# Patient Record
Sex: MALE | URBAN - METROPOLITAN AREA
[De-identification: names, ages, dates, MRNs, and addresses within clinical notes are randomized per-mention and may not be internally consistent; named-entity substitution may affect disease eponyms.]

---

## 2017-02-09 ENCOUNTER — IMPORTED ENCOUNTER (OUTPATIENT)
Dept: URBAN - METROPOLITAN AREA CLINIC 59 | Facility: CLINIC | Age: 77
End: 2017-02-09

## 2017-02-09 PROBLEM — H11.31 SUBCONJUNCTIVAL HEMORRHAGE OF RT EYE: Noted: 2017-02-09

## 2017-02-09 PROCEDURE — 99214 OFFICE O/P EST MOD 30 MIN: CPT

## 2017-02-27 ENCOUNTER — IMPORTED ENCOUNTER (OUTPATIENT)
Dept: URBAN - METROPOLITAN AREA CLINIC 59 | Facility: CLINIC | Age: 77
End: 2017-02-27

## 2017-02-27 PROBLEM — H40.053 OCULAR HYPERTENSION, BILATERAL: Noted: 2017-02-27

## 2017-02-27 PROBLEM — H40.023 OPEN ANGLE WITH BORDERLINE FINDINGS, HIGH RISK, BILATERAL: Noted: 2017-02-27

## 2017-02-27 PROBLEM — H25.11 NUCLEAR SCLEROSIS CATARACT OF RT EYE: Noted: 2017-02-27

## 2017-02-27 PROBLEM — E11.9 TYPE II DM W/O COMPLICATIONS: Noted: 2017-02-27

## 2017-02-27 PROBLEM — H25.12 NUCLEAR SCLEROSIS CATARACT OF LT EYE: Noted: 2017-02-27

## 2017-02-27 PROCEDURE — 92133 CPTRZD OPH DX IMG PST SGM ON: CPT

## 2017-02-27 PROCEDURE — 92012 INTRM OPH EXAM EST PATIENT: CPT

## 2017-08-14 ENCOUNTER — IMPORTED ENCOUNTER (OUTPATIENT)
Dept: URBAN - METROPOLITAN AREA CLINIC 59 | Facility: CLINIC | Age: 77
End: 2017-08-14

## 2017-08-14 PROBLEM — H40.023 OPEN ANGLE WITH BORDERLINE FINDINGS, HIGH RISK, BILATERAL: Noted: 2017-08-14

## 2017-08-14 PROCEDURE — 92083 EXTENDED VISUAL FIELD XM: CPT

## 2017-08-21 ENCOUNTER — IMPORTED ENCOUNTER (OUTPATIENT)
Dept: URBAN - METROPOLITAN AREA CLINIC 59 | Facility: CLINIC | Age: 77
End: 2017-08-21

## 2017-08-21 PROBLEM — H25.11 NUCLEAR SCLEROSIS CATARACT OF RT EYE: Noted: 2017-08-21

## 2017-08-21 PROBLEM — E11.9 TYPE II DM W/O COMPLICATIONS: Noted: 2017-08-21

## 2017-08-21 PROBLEM — H25.12 NUCLEAR SCLEROSIS CATARACT OF LT EYE: Noted: 2017-08-21

## 2017-08-21 PROBLEM — H40.053 OCULAR HYPERTENSION, BILATERAL: Noted: 2017-08-21

## 2017-08-21 PROCEDURE — 92133 CPTRZD OPH DX IMG PST SGM ON: CPT

## 2017-08-21 PROCEDURE — 92014 COMPRE OPH EXAM EST PT 1/>: CPT

## 2017-09-06 ENCOUNTER — IMPORTED ENCOUNTER (OUTPATIENT)
Dept: URBAN - METROPOLITAN AREA CLINIC 59 | Facility: CLINIC | Age: 77
End: 2017-09-06

## 2017-09-06 PROBLEM — E11.9 TYPE II DM W/O COMPLICATIONS: Noted: 2017-09-06

## 2017-09-06 PROBLEM — H25.11 NUCLEAR SCLEROSIS CATARACT OF RT EYE: Noted: 2017-09-06

## 2017-09-06 PROBLEM — H40.023 OPEN ANGLE WITH BORDERLINE FINDINGS, HIGH RISK, BILATERAL: Noted: 2017-09-06

## 2017-09-06 PROBLEM — H25.12 NUCLEAR SCLEROSIS CATARACT OF LT EYE: Noted: 2017-09-06

## 2017-09-06 PROBLEM — H40.053 OCULAR HYPERTENSION, BILATERAL: Noted: 2017-09-06

## 2017-09-06 PROCEDURE — 92012 INTRM OPH EXAM EST PATIENT: CPT

## 2017-09-06 PROCEDURE — 92136 OPHTHALMIC BIOMETRY: CPT

## 2017-10-19 ENCOUNTER — IMPORTED ENCOUNTER (OUTPATIENT)
Dept: URBAN - METROPOLITAN AREA CLINIC 59 | Facility: CLINIC | Age: 77
End: 2017-10-19

## 2017-10-19 PROCEDURE — 66982 XCAPSL CTRC RMVL CPLX WO ECP: CPT

## 2017-10-20 ENCOUNTER — IMPORTED ENCOUNTER (OUTPATIENT)
Dept: URBAN - METROPOLITAN AREA CLINIC 59 | Facility: CLINIC | Age: 77
End: 2017-10-20

## 2017-10-20 PROBLEM — Z96.1 PRESENCE OF PSEUDOPHAKIA: Noted: 2017-10-20

## 2017-10-20 PROCEDURE — 99024 POSTOP FOLLOW-UP VISIT: CPT

## 2017-10-26 ENCOUNTER — IMPORTED ENCOUNTER (OUTPATIENT)
Dept: URBAN - METROPOLITAN AREA CLINIC 59 | Facility: CLINIC | Age: 77
End: 2017-10-26

## 2017-10-26 PROCEDURE — 66982 XCAPSL CTRC RMVL CPLX WO ECP: CPT

## 2017-10-27 ENCOUNTER — IMPORTED ENCOUNTER (OUTPATIENT)
Dept: URBAN - METROPOLITAN AREA CLINIC 59 | Facility: CLINIC | Age: 77
End: 2017-10-27

## 2017-10-27 PROCEDURE — 99024 POSTOP FOLLOW-UP VISIT: CPT

## 2017-11-06 ENCOUNTER — IMPORTED ENCOUNTER (OUTPATIENT)
Dept: URBAN - METROPOLITAN AREA CLINIC 59 | Facility: CLINIC | Age: 77
End: 2017-11-06

## 2017-11-06 PROBLEM — Z96.1 PRESENCE OF PSEUDOPHAKIA: Noted: 2017-11-06

## 2017-11-06 PROCEDURE — 99024 POSTOP FOLLOW-UP VISIT: CPT

## 2017-11-29 ENCOUNTER — IMPORTED ENCOUNTER (OUTPATIENT)
Dept: URBAN - METROPOLITAN AREA CLINIC 59 | Facility: CLINIC | Age: 77
End: 2017-11-29

## 2017-11-29 PROBLEM — Z96.1 PRESENCE OF PSEUDOPHAKIA: Noted: 2017-11-29

## 2017-11-29 PROCEDURE — 92015 DETERMINE REFRACTIVE STATE: CPT

## 2017-11-29 PROCEDURE — 99024 POSTOP FOLLOW-UP VISIT: CPT

## 2018-03-14 ENCOUNTER — IMPORTED ENCOUNTER (OUTPATIENT)
Dept: URBAN - METROPOLITAN AREA CLINIC 59 | Facility: CLINIC | Age: 78
End: 2018-03-14

## 2018-03-14 PROBLEM — H26.493 OTHER SECONDARY CATARACT, BILATERAL: Noted: 2018-03-14

## 2018-03-14 PROBLEM — H40.023 OPEN ANGLE WITH BORDERLINE FINDINGS, HIGH RISK, BILATERAL: Noted: 2018-03-14

## 2018-03-14 PROBLEM — E11.9 TYPE II DM W/O COMPLICATIONS: Noted: 2018-03-14

## 2018-03-14 PROCEDURE — 92014 COMPRE OPH EXAM EST PT 1/>: CPT

## 2018-03-14 PROCEDURE — 92250 FUNDUS PHOTOGRAPHY W/I&R: CPT

## 2018-09-19 ENCOUNTER — IMPORTED ENCOUNTER (OUTPATIENT)
Dept: URBAN - METROPOLITAN AREA CLINIC 59 | Facility: CLINIC | Age: 78
End: 2018-09-19

## 2018-09-19 PROBLEM — H26.492 OTHER SECONDARY CATARACT, LEFT EYE: Noted: 2018-09-19

## 2018-09-19 PROBLEM — H40.023 OPEN ANGLE WITH BORDERLINE FINDINGS, HIGH RISK, BILATERAL: Noted: 2018-09-19

## 2018-09-19 PROBLEM — H26.491 PRESENCE OF INTRAOCULAR LENS: Noted: 2018-09-19

## 2018-09-19 PROCEDURE — 92012 INTRM OPH EXAM EST PATIENT: CPT

## 2019-04-03 ENCOUNTER — IMPORTED ENCOUNTER (OUTPATIENT)
Dept: URBAN - METROPOLITAN AREA CLINIC 59 | Facility: CLINIC | Age: 79
End: 2019-04-03

## 2019-04-03 PROBLEM — H26.492 OTHER SECONDARY CATARACT, LEFT EYE: Noted: 2019-04-03

## 2019-04-03 PROBLEM — H26.491 PRESENCE OF INTRAOCULAR LENS: Noted: 2019-04-03

## 2019-04-03 PROBLEM — H40.023 OPEN ANGLE WITH BORDERLINE FINDINGS, HIGH RISK, BILATERAL: Noted: 2019-04-03

## 2019-04-03 PROCEDURE — 92133 CPTRZD OPH DX IMG PST SGM ON: CPT

## 2019-04-03 PROCEDURE — 92014 COMPRE OPH EXAM EST PT 1/>: CPT

## 2019-10-02 ENCOUNTER — IMPORTED ENCOUNTER (OUTPATIENT)
Dept: URBAN - METROPOLITAN AREA CLINIC 59 | Facility: CLINIC | Age: 79
End: 2019-10-02

## 2019-10-02 PROBLEM — H40.023 OPEN ANGLE WITH BORDERLINE FINDINGS, HIGH RISK, BILATERAL: Noted: 2019-10-02

## 2019-10-02 PROBLEM — H26.492 OTHER SECONDARY CATARACT, LEFT EYE: Noted: 2019-10-02

## 2019-10-02 PROBLEM — H40.1132 PRIMARY OPEN-ANGLE GLAUCOMA, BILATERAL, MODERATE STAGE: Noted: 2019-10-02

## 2019-10-02 PROBLEM — H26.491 PRESENCE OF INTRAOCULAR LENS: Noted: 2019-10-02

## 2019-10-02 PROCEDURE — 92083 EXTENDED VISUAL FIELD XM: CPT

## 2019-10-02 PROCEDURE — 92015 DETERMINE REFRACTIVE STATE: CPT

## 2019-10-02 PROCEDURE — 92012 INTRM OPH EXAM EST PATIENT: CPT

## 2021-04-07 ENCOUNTER — IMPORTED ENCOUNTER (OUTPATIENT)
Dept: URBAN - METROPOLITAN AREA CLINIC 59 | Facility: CLINIC | Age: 81
End: 2021-04-07

## 2021-04-07 PROBLEM — H26.492 OTHER SECONDARY CATARACT, LEFT EYE: Noted: 2021-04-07

## 2021-04-07 PROBLEM — H40.1132 PRIMARY OPEN-ANGLE GLAUCOMA, BILATERAL, MODERATE STAGE: Noted: 2021-04-07

## 2021-04-07 PROBLEM — H26.491 PRESENCE OF INTRAOCULAR LENS: Noted: 2021-04-07

## 2021-04-07 PROCEDURE — 92014 COMPRE OPH EXAM EST PT 1/>: CPT

## 2021-04-07 PROCEDURE — 92133 CPTRZD OPH DX IMG PST SGM ON: CPT

## 2021-10-06 ENCOUNTER — IMPORTED ENCOUNTER (OUTPATIENT)
Dept: URBAN - METROPOLITAN AREA CLINIC 59 | Facility: CLINIC | Age: 81
End: 2021-10-06

## 2021-10-06 PROBLEM — H26.492 OTHER SECONDARY CATARACT, LEFT EYE: Noted: 2021-10-06

## 2021-10-06 PROBLEM — H26.491 PRESENCE OF INTRAOCULAR LENS: Noted: 2021-10-06

## 2021-10-06 PROBLEM — H40.1132 PRIMARY OPEN-ANGLE GLAUCOMA, BILATERAL, MODERATE STAGE: Noted: 2021-10-06

## 2021-10-06 PROCEDURE — 99214 OFFICE O/P EST MOD 30 MIN: CPT

## 2021-10-06 PROCEDURE — 92133 CPTRZD OPH DX IMG PST SGM ON: CPT

## 2021-10-06 PROCEDURE — 92083 EXTENDED VISUAL FIELD XM: CPT

## 2022-03-14 ENCOUNTER — IMPORTED ENCOUNTER (OUTPATIENT)
Dept: URBAN - METROPOLITAN AREA CLINIC 59 | Facility: CLINIC | Age: 82
End: 2022-03-14

## 2022-03-14 PROBLEM — H40.1132 POAG, MODERATE: Noted: 2022-03-14

## 2022-03-14 PROBLEM — H26.493 OTHER SECONDARY CATARACT, LEFT EYE: Noted: 2022-03-14

## 2022-03-14 PROBLEM — H26.491 PRESENCE OF INTRAOCULAR LENS: Noted: 2022-03-14

## 2022-03-14 PROBLEM — H26.492 OTHER SECONDARY CATARACT, LEFT EYE: Noted: 2022-03-14

## 2022-03-14 PROBLEM — H40.1132 PRIMARY OPEN-ANGLE GLAUCOMA, BILATERAL, MODERATE STAGE: Noted: 2022-03-14

## 2022-03-14 PROBLEM — H26.493 PRESENCE OF INTRAOCULAR LENS: Noted: 2022-03-14

## 2022-03-14 PROCEDURE — 92012 INTRM OPH EXAM EST PATIENT: CPT

## 2022-11-16 ENCOUNTER — ESTABLISHED COMPREHENSIVE EXAM (OUTPATIENT)
Dept: URBAN - METROPOLITAN AREA CLINIC 59 | Facility: CLINIC | Age: 82
End: 2022-11-16

## 2022-11-16 DIAGNOSIS — H40.1132: ICD-10-CM

## 2022-11-16 DIAGNOSIS — H26.493: ICD-10-CM

## 2022-11-16 PROCEDURE — 92014 COMPRE OPH EXAM EST PT 1/>: CPT

## 2022-11-16 PROCEDURE — 92133 CPTRZD OPH DX IMG PST SGM ON: CPT

## 2022-11-16 ASSESSMENT — TONOMETRY
OS_IOP_MMHG: 16
OD_IOP_MMHG: 16

## 2022-11-16 ASSESSMENT — VISUAL ACUITY
OU_SC: 20/25
OD_SC: 20/25-2
OS_SC: 20/30

## 2023-07-31 ENCOUNTER — ESTABLISHED COMPREHENSIVE EXAM (OUTPATIENT)
Dept: URBAN - METROPOLITAN AREA CLINIC 59 | Facility: CLINIC | Age: 83
End: 2023-07-31

## 2023-07-31 DIAGNOSIS — H18.453: ICD-10-CM

## 2023-07-31 DIAGNOSIS — H04.123: ICD-10-CM

## 2023-07-31 DIAGNOSIS — H40.1132: ICD-10-CM

## 2023-07-31 DIAGNOSIS — Z96.1: ICD-10-CM

## 2023-07-31 DIAGNOSIS — H26.492: ICD-10-CM

## 2023-07-31 DIAGNOSIS — E11.9: ICD-10-CM

## 2023-07-31 PROCEDURE — 92083 EXTENDED VISUAL FIELD XM: CPT

## 2023-07-31 PROCEDURE — 92014 COMPRE OPH EXAM EST PT 1/>: CPT

## 2023-07-31 ASSESSMENT — TONOMETRY
OS_IOP_MMHG: 14
OD_IOP_MMHG: 14

## 2023-07-31 ASSESSMENT — VISUAL ACUITY
OD_CC: 20/20-1
OS_CC: 20/20

## 2023-10-21 ASSESSMENT — VISUAL ACUITY
OD_SC: 20/20-2
OS_SC: 20/70
OD_CC: 20/40-2
OS_CC: 20/50
OS_BAT: 20/60
OD_CC: 20/25
OD_SC: 20/20-2
OU_SC: 20/20-
OS_SC: 20/25-3
OD_SC: 20/20-2
OD_SC: 20/25+1
OD_CC: 20/25
OD_SC: 20/25+2
OD_SC: 20/50
OD_CC: 20/50
OD_CC: 20/25-2
OD_SC: 20/25
OS_CC: J1+
OD_SC: 20/25+2
OD_CC: J1+
OS_SC: 20/25-2
OS_BAT: 20/80
OD_BAT: 20/100
OS_SC: 20/40+1
OS_SC: 20/50+1
OS_SC: 20/30+1
OS_CC: 20/25
OS_CC: 20/40-2
OS_SC: 20/30
OD_BAT: 20/30
OS_SC: 20/25
OS_CC: 20/25-2
OS_SC: 20/40+2
OD_SC: 20/20-2
OS_CC: 20/30-2
OS_CC: 20/25
OD_SC: 20/25-2
OS_SC: 20/40
OD_CC: 20/50
OD_SC: 20/20

## 2023-10-21 ASSESSMENT — KERATOMETRY
OD_K1POWER_DIOPTERS: 43.00
OS_AXISANGLE2_DEGREES: 10
OD_AXISANGLE2_DEGREES: 6
OD_K2POWER_DIOPTERS: 44.25
OD_AXISANGLE_DEGREES: 96
OS_K2POWER_DIOPTERS: 44.25
OS_K1POWER_DIOPTERS: 43.00
OS_AXISANGLE_DEGREES: 100

## 2023-10-21 ASSESSMENT — PACHYMETRY
OS_CT_UM: C:  FINAL: 0.000; P:
OD_CT_UM: C:  FINAL: 0.000; P:
OD_CT_UM: C:  FINAL: 0.000; P:
OS_CT_UM: C:  FINAL: 0.000; P:
OS_CT_UM: C:  FINAL: 0.000; P:
OD_CT_UM: C:  FINAL: 0.000; P:
OD_CT_UM: C:  FINAL: 0.000; P:
OS_CT_UM: C:  FINAL: 0.000; P:
OS_CT_UM: C:  FINAL: 0.000; P:
OD_CT_UM: C:  FINAL: 0.000; P:
OS_CT_UM: C:  FINAL: 0.000; P:
OD_CT_UM: C:  FINAL: 0.000; P:

## 2023-10-21 ASSESSMENT — TONOMETRY
OS_IOP_MMHG: 15
OD_IOP_MMHG: 13
OD_IOP_MMHG: 16
OS_IOP_MMHG: 12
OD_IOP_MMHG: 11
OS_IOP_MMHG: 11
OD_IOP_MMHG: 13
OD_IOP_MMHG: 16
OD_IOP_MMHG: 15
OD_IOP_MMHG: 16
OS_IOP_MMHG: 13
OS_IOP_MMHG: 14
OD_IOP_MMHG: 15
OD_IOP_MMHG: 12
OS_IOP_MMHG: 14
OS_IOP_MMHG: 14
OS_IOP_MMHG: 18
OD_IOP_MMHG: 14
OS_IOP_MMHG: 12
OS_IOP_MMHG: 15

## 2024-02-26 ENCOUNTER — ESTABLISHED COMPREHENSIVE EXAM (OUTPATIENT)
Dept: URBAN - METROPOLITAN AREA CLINIC 59 | Facility: CLINIC | Age: 84
End: 2024-02-26

## 2024-02-26 DIAGNOSIS — Z96.1: ICD-10-CM

## 2024-02-26 DIAGNOSIS — H26.492: ICD-10-CM

## 2024-02-26 DIAGNOSIS — H18.453: ICD-10-CM

## 2024-02-26 DIAGNOSIS — E11.9: ICD-10-CM

## 2024-02-26 DIAGNOSIS — H40.1132: ICD-10-CM

## 2024-02-26 DIAGNOSIS — H04.123: ICD-10-CM

## 2024-02-26 DIAGNOSIS — H35.61: ICD-10-CM

## 2024-02-26 PROCEDURE — 92014 COMPRE OPH EXAM EST PT 1/>: CPT

## 2024-02-26 PROCEDURE — 92133 CPTRZD OPH DX IMG PST SGM ON: CPT

## 2024-02-26 ASSESSMENT — VISUAL ACUITY
OD_CC: 20/20-1
OS_CC: 20/20-2

## 2024-02-26 ASSESSMENT — TONOMETRY
OD_IOP_MMHG: 13
OS_IOP_MMHG: 13

## 2024-06-12 ENCOUNTER — ESTABLISHED COMPREHENSIVE EXAM (OUTPATIENT)
Dept: URBAN - METROPOLITAN AREA CLINIC 59 | Facility: CLINIC | Age: 84
End: 2024-06-12

## 2024-06-12 DIAGNOSIS — H04.123: ICD-10-CM

## 2024-06-12 DIAGNOSIS — H40.1132: ICD-10-CM

## 2024-06-12 DIAGNOSIS — E11.9: ICD-10-CM

## 2024-06-12 DIAGNOSIS — H18.453: ICD-10-CM

## 2024-06-12 DIAGNOSIS — H35.61: ICD-10-CM

## 2024-06-12 DIAGNOSIS — Z96.1: ICD-10-CM

## 2024-06-12 DIAGNOSIS — H26.492: ICD-10-CM

## 2024-06-12 PROCEDURE — 92083 EXTENDED VISUAL FIELD XM: CPT

## 2024-06-12 PROCEDURE — 92014 COMPRE OPH EXAM EST PT 1/>: CPT

## 2024-06-12 ASSESSMENT — VISUAL ACUITY
OD_PH: 20/20-2
OD_CC: 20/25
OS_CC: 20/25-2

## 2024-06-12 ASSESSMENT — TONOMETRY
OS_IOP_MMHG: 16
OD_IOP_MMHG: 17

## 2024-09-30 ENCOUNTER — APPOINTMENT (RX ONLY)
Dept: URBAN - METROPOLITAN AREA CLINIC 341 | Facility: CLINIC | Age: 84
Setting detail: DERMATOLOGY
End: 2024-09-30

## 2024-09-30 DIAGNOSIS — L80 VITILIGO: ICD-10-CM | Status: INADEQUATELY CONTROLLED

## 2024-09-30 PROCEDURE — 99204 OFFICE O/P NEW MOD 45 MIN: CPT

## 2024-09-30 PROCEDURE — ? PRESCRIPTION

## 2024-09-30 PROCEDURE — ? PRESCRIPTION MEDICATION MANAGEMENT

## 2024-09-30 PROCEDURE — ? COUNSELING

## 2024-09-30 RX ORDER — TRIAMCINOLONE ACETONIDE 1 MG/G
CREAM TOPICAL BID
Qty: 80 | Refills: 3 | Status: ERX | COMMUNITY
Start: 2024-09-30

## 2024-09-30 RX ADMIN — TRIAMCINOLONE ACETONIDE 1: 1 CREAM TOPICAL at 00:00

## 2024-09-30 ASSESSMENT — LOCATION ZONE DERM
LOCATION ZONE: SCALP
LOCATION ZONE: HAND
LOCATION ZONE: ARM

## 2024-09-30 ASSESSMENT — LOCATION SIMPLE DESCRIPTION DERM
LOCATION SIMPLE: RIGHT HAND
LOCATION SIMPLE: LEFT FOREARM
LOCATION SIMPLE: ANTERIOR SCALP
LOCATION SIMPLE: LEFT HAND
LOCATION SIMPLE: RIGHT FOREARM

## 2024-09-30 ASSESSMENT — LOCATION DETAILED DESCRIPTION DERM
LOCATION DETAILED: LEFT PROXIMAL DORSAL FOREARM
LOCATION DETAILED: RIGHT ULNAR DORSAL HAND
LOCATION DETAILED: RIGHT DISTAL DORSAL FOREARM
LOCATION DETAILED: MID-FRONTAL SCALP
LOCATION DETAILED: LEFT ULNAR DORSAL HAND

## 2024-09-30 NOTE — PROCEDURE: PRESCRIPTION MEDICATION MANAGEMENT
Render In Strict Bullet Format?: No
Detail Level: Zone
Initiate Treatment: triamcinolone acetonide 0.1 % topical cream BID. Apply twice daily to rash on the arms and scalp up to 2 weeks out of a month, then as needed.

## 2025-01-15 ENCOUNTER — ESTABLISHED COMPREHENSIVE EXAM (OUTPATIENT)
Dept: URBAN - METROPOLITAN AREA CLINIC 59 | Facility: CLINIC | Age: 85
End: 2025-01-15

## 2025-01-15 DIAGNOSIS — E11.9: ICD-10-CM

## 2025-01-15 DIAGNOSIS — H40.1132: ICD-10-CM

## 2025-01-15 DIAGNOSIS — H35.61: ICD-10-CM

## 2025-01-15 DIAGNOSIS — H04.123: ICD-10-CM

## 2025-01-15 PROCEDURE — 92014 COMPRE OPH EXAM EST PT 1/>: CPT

## 2025-01-15 PROCEDURE — 92250 FUNDUS PHOTOGRAPHY W/I&R: CPT

## 2025-01-15 ASSESSMENT — TONOMETRY
OS_IOP_MMHG: 16
OD_IOP_MMHG: 16

## 2025-01-15 ASSESSMENT — VISUAL ACUITY
OU_CC: 20/20
OS_CC: 20/20
OD_CC: 20/30-1
OD_PH: 20/20-1